# Patient Record
Sex: MALE | Race: BLACK OR AFRICAN AMERICAN | NOT HISPANIC OR LATINO | Employment: UNEMPLOYED | URBAN - METROPOLITAN AREA
[De-identification: names, ages, dates, MRNs, and addresses within clinical notes are randomized per-mention and may not be internally consistent; named-entity substitution may affect disease eponyms.]

---

## 2017-09-25 ENCOUNTER — HOSPITAL ENCOUNTER (OUTPATIENT)
Dept: MEDSURG UNIT | Facility: HOSPITAL | Age: 42
End: 2017-09-26
Attending: SURGERY | Admitting: SURGERY

## 2018-02-20 ENCOUNTER — HISTORICAL (OUTPATIENT)
Dept: ADMINISTRATIVE | Facility: HOSPITAL | Age: 43
End: 2018-02-20

## 2018-03-19 ENCOUNTER — HISTORICAL (OUTPATIENT)
Dept: ADMINISTRATIVE | Facility: HOSPITAL | Age: 43
End: 2018-03-19

## 2018-06-13 ENCOUNTER — HISTORICAL (OUTPATIENT)
Dept: ADMINISTRATIVE | Facility: HOSPITAL | Age: 43
End: 2018-06-13

## 2018-07-06 ENCOUNTER — HISTORICAL (OUTPATIENT)
Dept: ADMINISTRATIVE | Facility: HOSPITAL | Age: 43
End: 2018-07-06

## 2018-11-16 ENCOUNTER — HISTORICAL (OUTPATIENT)
Dept: ADMINISTRATIVE | Facility: HOSPITAL | Age: 43
End: 2018-11-16

## 2019-01-28 ENCOUNTER — HISTORICAL (OUTPATIENT)
Dept: ADMINISTRATIVE | Facility: HOSPITAL | Age: 44
End: 2019-01-28

## 2019-01-28 LAB
BUN SERPL-MCNC: 84 MG/DL (ref 7–18)
CALCIUM SERPL-MCNC: 8.7 MG/DL (ref 8.5–10.1)
CHLORIDE SERPL-SCNC: 102 MMOL/L (ref 98–107)
CO2 SERPL-SCNC: 27 MMOL/L (ref 21–32)
CREAT SERPL-MCNC: 13.4 MG/DL (ref 0.7–1.3)
GLUCOSE SERPL-MCNC: 99 MG/DL (ref 74–106)
POTASSIUM SERPL-SCNC: 5.4 MMOL/L (ref 3.5–5.1)
RESTICK: NORMAL
SODIUM SERPL-SCNC: 135 MMOL/L (ref 136–145)

## 2019-04-12 ENCOUNTER — HISTORICAL (OUTPATIENT)
Dept: CARDIOLOGY | Facility: HOSPITAL | Age: 44
End: 2019-04-12

## 2019-04-12 LAB
ABS NEUT (OLG): 3.42 X10(3)/MCL (ref 2.1–9.2)
BASOPHILS # BLD AUTO: 0 X10(3)/MCL (ref 0–0.2)
BASOPHILS NFR BLD AUTO: 0 %
BUN SERPL-MCNC: 53 MG/DL (ref 7–18)
CALCIUM SERPL-MCNC: 7.4 MG/DL (ref 8.5–10.1)
CHLORIDE SERPL-SCNC: 103 MMOL/L (ref 98–107)
CO2 SERPL-SCNC: 28 MMOL/L (ref 21–32)
CREAT SERPL-MCNC: 14.4 MG/DL (ref 0.7–1.3)
CREAT/UREA NIT SERPL: 3.7
EOSINOPHIL # BLD AUTO: 2.1 X10(3)/MCL (ref 0–0.9)
EOSINOPHIL NFR BLD AUTO: 28 %
ERYTHROCYTE [DISTWIDTH] IN BLOOD BY AUTOMATED COUNT: 16.2 % (ref 11.5–17)
GLUCOSE SERPL-MCNC: 111 MG/DL (ref 74–106)
HCT VFR BLD AUTO: 37.4 % (ref 42–52)
HGB BLD-MCNC: 11.6 GM/DL (ref 14–18)
LYMPHOCYTES # BLD AUTO: 1.8 X10(3)/MCL (ref 0.6–4.6)
LYMPHOCYTES NFR BLD AUTO: 23 %
MCH RBC QN AUTO: 32.8 PG (ref 27–31)
MCHC RBC AUTO-ENTMCNC: 31 GM/DL (ref 33–36)
MCV RBC AUTO: 105.6 FL (ref 80–94)
MONOCYTES # BLD AUTO: 0.4 X10(3)/MCL (ref 0.1–1.3)
MONOCYTES NFR BLD AUTO: 5 %
NEUTROPHILS # BLD AUTO: 3.42 X10(3)/MCL (ref 2.1–9.2)
NEUTROPHILS NFR BLD AUTO: 44 %
PLATELET # BLD AUTO: 87 X10(3)/MCL (ref 130–400)
PMV BLD AUTO: 10.7 FL (ref 9.4–12.4)
POTASSIUM SERPL-SCNC: 4.2 MMOL/L (ref 3.5–5.1)
RBC # BLD AUTO: 3.54 X10(6)/MCL (ref 4.7–6.1)
SODIUM SERPL-SCNC: 139 MMOL/L (ref 136–145)
WBC # SPEC AUTO: 7.8 X10(3)/MCL (ref 4.5–11.5)

## 2019-04-16 ENCOUNTER — HISTORICAL (OUTPATIENT)
Dept: ADMINISTRATIVE | Facility: HOSPITAL | Age: 44
End: 2019-04-16

## 2019-05-11 ENCOUNTER — HISTORICAL (OUTPATIENT)
Dept: ADMINISTRATIVE | Facility: HOSPITAL | Age: 44
End: 2019-05-11

## 2019-05-12 ENCOUNTER — HISTORICAL (OUTPATIENT)
Dept: ADMINISTRATIVE | Facility: HOSPITAL | Age: 44
End: 2019-05-12

## 2019-05-13 ENCOUNTER — HISTORICAL (OUTPATIENT)
Dept: ADMINISTRATIVE | Facility: HOSPITAL | Age: 44
End: 2019-05-13

## 2019-05-15 ENCOUNTER — HISTORICAL (OUTPATIENT)
Dept: ADMINISTRATIVE | Facility: HOSPITAL | Age: 44
End: 2019-05-15

## 2019-05-22 ENCOUNTER — HISTORICAL (OUTPATIENT)
Dept: ADMINISTRATIVE | Facility: HOSPITAL | Age: 44
End: 2019-05-22

## 2019-07-03 ENCOUNTER — HISTORICAL (OUTPATIENT)
Dept: ADMINISTRATIVE | Facility: HOSPITAL | Age: 44
End: 2019-07-03

## 2019-07-15 ENCOUNTER — HISTORICAL (OUTPATIENT)
Dept: ADMINISTRATIVE | Facility: HOSPITAL | Age: 44
End: 2019-07-15

## 2019-08-28 ENCOUNTER — HISTORICAL (OUTPATIENT)
Dept: ADMINISTRATIVE | Facility: HOSPITAL | Age: 44
End: 2019-08-28

## 2019-10-05 ENCOUNTER — HISTORICAL (OUTPATIENT)
Dept: ADMINISTRATIVE | Facility: HOSPITAL | Age: 44
End: 2019-10-05

## 2019-10-27 ENCOUNTER — HISTORICAL (OUTPATIENT)
Dept: ADMINISTRATIVE | Facility: HOSPITAL | Age: 44
End: 2019-10-27

## 2020-02-04 ENCOUNTER — HISTORICAL (OUTPATIENT)
Dept: ADMINISTRATIVE | Facility: HOSPITAL | Age: 45
End: 2020-02-04

## 2020-02-06 ENCOUNTER — HISTORICAL (OUTPATIENT)
Dept: ADMINISTRATIVE | Facility: HOSPITAL | Age: 45
End: 2020-02-06

## 2020-03-03 ENCOUNTER — HISTORICAL (OUTPATIENT)
Dept: ADMINISTRATIVE | Facility: HOSPITAL | Age: 45
End: 2020-03-03

## 2020-04-27 ENCOUNTER — HISTORICAL (OUTPATIENT)
Dept: ADMINISTRATIVE | Facility: HOSPITAL | Age: 45
End: 2020-04-27

## 2022-04-28 NOTE — OP NOTE
ADMITTING DIAGNOSIS:  Infected pseudoaneurysm on the arterial limb of a straight arm arteriovenous graft, left forearm.    PROCEDURES:    1. Incision, drainage, debridement, washout of infected pseudoaneurysm on the arterial limb of a straight arm left forearm arteriovenous graft.   2. Exposure of the graft and identification of a bleeding source from the graft from a previous thrombectomy a year or 2 ago.   3. Removal of graftotomy sutures and thrombectomy due to pulsatile flow in the graft from hemostasis control using a #3 thrombectomy catheter.   4. Resuturing of the graft.   5. Closure of the skin and subcutaneous tissue overlying the graft.    The patient is a 42-year-old  male who had an AV graft that was with an infected pseudoaneurysm on the arterial limb near the radial graft anastomosis.  Patient underwent incision, drainage, and culturing of this area.  Aerobic/anaerobic Gram stain cultures were obtained.  This was done under general anesthetic and prepped and draped under sterile conditions.  Upon cleaning up the infected abscessed area, patient was noted to have a pulsatile bleeder that was from the graft itself.  This underwent control with proximal control the graft using digital pressure.  Patient then had suturing of the graft initially to stop the bleeding.  After this was completed, re-evaluation of the graft was done and there was no bruit and thrill.  I needed to do a thrombectomy and as a result, opened the graft, did an open thrombectomy with a #3 Sudhakar, removed clot that was causing the obstruction and then had reapproximation of the graft with 6-0 Prolene.  After this was accomplished, patient underwent reapproximation of the skin and subcutaneous tissue over the graft with 3-0 nylon.  The deep layer was closed with 3-0 Vicryl.  The skin and subcu was closed with 3-0 nylon.  Sterile dressings were applied.  No problems were encountered.  Patient tolerated the  procedure well.  It should be noted that patient had a good bruit and thrill upon completion and, I think, could probably undergo dialysis.  His pressure is a little bit up.  His last dialysis was on Saturday.  I will consult Ever Munoz M.D., and Carlo Vieyra M.D., for assistance in medical care.  I appreciate the consultation and     assistance of Ever Munoz M.D., and Carlo Vieyra M.D., in the management of this case.        BBS/MODL   DD: 09/25/2017 1536   DT: 09/25/2017 1611  Job # [id number]    cc: SONYA Dumont M.D.

## 2022-04-28 NOTE — CONSULTS
DATE OF CONSULTATION:  09/25/2017    CONSULTING PHYSICIAN:  Ever Munoz M.D.    REASON FOR CONSULTATION:  Medical management of the accelerated hypertension in a patient who has undergone surgery for infected pseudoaneurysm on the arterial limb of the straight arm AV graft in the left forearm.    HISTORY OF PRESENT ILLNESS:  The patient is a very pleasant 42-year-old black male with a history of end-stage renal disease, on Tuesday, Thursday, Saturday cycle of hemodialysis at [place] under 1 week hemodialysis clinic was brought to the [facility] by Dr. Kayode Alamo for the incision and drainage and debridement and washout of the infected pseudoaneurysm on the lateral straight arm left forearm AV graft which was done successfully today in the evening and after that, the patient had uncontrolled hypertension.  I was consulted for the same.  We gave him some clonidine and some labetalol and his blood pressures have come down and his medications have been resumed which for sure will bring the blood pressure down.    PAST MEDICAL HISTORY:  Significant for end-stage renal disease on dialysis, hypertension, gastroesophageal reflux disease.    PAST SURGICAL HISTORY:  Includes multiple surgeries for thrombectomy and fistula placement.    SOCIAL HISTORY:  He does smoke and smokes occasionally some marijuana.    FAMILY HISTORY:  Mother had diabetes.  Father is living.    ALLERGIES:  NO KNOWN DRUG ALLERGIES.      MEDICATIONS:  Clonidine 0.3 mg t.i.d., Renagel 800 mg capsule t.i.d., Norvasc 10 mg daily, docusate 100 mg b.i.d., hydralazine 100 mg t.i.d., minoxidil 2.5 mg b.i.d., nifedipine 90 mg daily.    REVIEW OF SYSTEMS:  As per history of present illness.    PHYSICAL EXAMINATION:  VITAL SIGNS:  The systolic blood pressure is above 170 and diastolic blood pressure is above 110.     GENERAL:  Patient is alert and oriented x3, in no acute distress.   HEENT:  Head is normocephalic.  Pupils are  bilaterally reactive to light, equal in size.  Mild conjunctival pallor.  No scleral icterus.  Trachea is in midline.   CHEST:  Has good bilateral air entry.   CVS:  First and second heart sounds are heard normally with soft systolic murmurs.     ABDOMEN:  Soft, nontender.     EXTREMITIES:  Devoid of any edema, cyanosis, or clubbing.    LABORATORY DATA:  Labs and investigations are as follows:  Sodium 138, potassium 4.3, chloride 103, bicarb 26, calcium 8.0, glucose 98, BUN 62, creatinine 12.18, WBC 7.9, hemoglobin 10.2, hematocrit 31.1 platelet count 148.    ASSESSMENT/PLAN:  Plan is to consult Nephrology for dialysis.  Resume the home medications.  Labetalol IV or __________ IV will be used to bring the blood pressure down.  Monitor the blood pressure.  DVT prophylaxis as per nephrologist protocol.  GI prophylaxis with proton pump inhibitors.     It was a pleasure taking care of the patient during his stay at [facility].  I thank Dr. Kayode Alamo for the consult.        MARGUERITE/ROMEO   DD: 09/25/2017 2047   DT: 09/25/2017 2108  Job # [id number]    cc: Angelito Alamo M.D.

## 2022-04-28 NOTE — OP NOTE
DATE OF SURGERY:    01/28/2019    SURGEON:  Demetri Zarate MD    PREOP DIAGNOSIS:  End-stage renal disease.    POSTOP DIAGNOSIS:  End-stage renal disease.    PROCEDURE PERFORMED:  Left upper extremity arteriovenous fistula revision with branch ligation/superficialization.    ANESTHESIA:  Local with 1% lidocaine.    ESTIMATED BLOOD LOSS:  Less than 30 cc.    COMPLICATIONS:  None.    INDICATIONS FOR PROCEDURE:  The patient is a 44-year-old male with history of end-stage renal disease.  He has a brachiobasilic fistula which has matured; however, this is too deep for cannulation and operation indicated for superficialization.    PROCEDURE IN DETAIL:  After informed consent was obtained, the patient was taken to the operating room and placed in supine position, prepped and draped in usual sterile fashion.  We used ultrasound to identify the fistula.  We marked this throughout its length.  We made an incision with a 15 blade scalpel and carried this down with Bovie electrocauterization.  The fistula was identified and circumferentially dissected.  The fistula was matured.  All side branches were suture ligated and the fistula mobilized easily to the skin surface.  A small flap was made to the lateral aspect of the wound.  The subcutaneous tissue was closed with 3-0 Vicryl suture in a running fashion beneath the fistula, and the remaining tissue was closed with 3-0 Vicryl running fashion followed by 4-0 Monocryl in subcuticular fashion, followed by Dermabond.  The patient tolerated the procedure well without any events or complications, and the patient     was transferred in stable condition to recovery room.  The fistula had an obvious thrill at the conclusion of the procedure.      ______________________________  MD RAFAT Lee III/QI  DD:  01/28/2019  Time:  02:27PM  DT:  01/28/2019  Time:  05:12PM  Job #:  [id number]

## 2022-04-28 NOTE — OP NOTE
DATE OF SURGERY:    04/12/2019    SURGEON:  Demetri Zarate MD    PREOPERATIVE DIAGNOSIS:  End-stage renal disease.    POSTOPERATIVE DIAGNOSIS:  End-stage renal disease.    PROCEDURE PERFORMED:    1. Ultrasound access of left upper extremity arteriovenous fistula.  2. Fistulogram.  3. Balloon angioplasty of venous outflow stenosis.    ANESTHESIA:  Local with 1% lidocaine.    ESTIMATED BLOOD LOSS:  Less than 15 cc.    COMPLICATIONS:  None.    INDICATION FOR PROCEDURE:  The patient is a 44-year-old male with end-stage renal disease who currently undergoes dialysis via a tunneled dialysis catheter.  He has a brachiobasilic fistula in place with pulsatility, and operation indicated for further investigation.    PROCEDURE IN DETAIL:  After informed consent was obtained, the patient was taken to the operating room, placed in supine position, prepped and draped in sterile fashion.  We chose to identify the fistula in the direction of venous outflow.  We accessed with a micropuncture needle followed by a wire via Seldinger technique and ultimately placed a 4-Setswana sheath.  We performed a fistulogram which revealed a widely patent fistula and a focal stenosis of the venous outflow.  The central venous system was widely patent.  We crossed this venous outflow stenosis with a wire and proved true lumen with contrast injection.  We performed balloon angioplasty of this lesion with complete resolution.  There was no arterial inflow stenosis.  At this point, the sheath was removed and a pursestring suture was placed and the site was     hemostatic.  The patient tolerated the procedure without any acute events or complications and was transferred in stable condition to recovery.        ______________________________  MD RAFAT Lee III/  DD:  04/12/2019  Time:  12:01PM  DT:  04/12/2019  Time:  12:21PM  Job #:  [id number]

## 2022-04-28 NOTE — OP NOTE
DATE OF SURGERY:    11/16/2018    SURGEON:  Demetri Zarate MD    PREOPERATIVE DIAGNOSIS:  End-stage renal disease.    POSTOPERATIVE DIAGNOSIS:  End-stage renal disease.    PRESSURE PERFORMED:  Left upper extremity brachial basilic fistula.    ANESTHESIA:  General endotracheal anesthesia.    ESTIMATED BLOOD LOSS:  Less than 20 cc.    COMPLICATIONS:  None.    INDICATIONS FOR PROCEDURE:  Patient is a 43-year-old male with end-stage renal disease.  He has been seen by Dr. Winn in the past and has had a left forearm graft placed, which became infected, and required excision.  He now requires upper extremity access for hemodialysis.    PROCEDURE IN DETAIL:  After informed consent was obtained, the patient was taken to the operating room, placed in supine position.  Prepped and draped in a sterile fashion.  We used ultrasound to identify the superficial veins in his upper extremity, and the cephalic vein was diminutive in size, and his basilic vein seemed suitable for fistula creation.  We made an incision near the antecubital fossa and carried this up with Bovie electrocauterization.  The basilic vein was identified and circumferentially dissected through its entire length and all side branches were suture ligated.  The vein was transected distally and insufflated very well with heparinized saline.  We identified the brachial artery and circumferentially dissected this for small segment and it appeared suitable for arterial inflow.  The patient was heparinized appropriately.  Clamps were placed proximal and distal to the brachial artery.  An 11 blade scalpel used to perform arteriotomy.  This was lengthened with Norris scissors.  The vein was spatulated appropriately and 6-0 Prolene suture was used in circumferential fashion to perform the anastomosis.  Prior to completion of the anastomosis, the usual flush maneuvers were performed.  Anastomosis was complete and anastomosis was hemostatic.  There was a palpable  ulnar pulse of the wrist and there was a palpable thrill in the fistula.  The wound was irrigated with copious amounts of fluid.  The wound was closed in multiple layers with 3-0 Vicryl suture in running fashion, followed by 4-0 Monocryl in subcuticular fashion, followed by     Dermabond.  The patient tolerated the procedure well without any acute events or complications, and was transferred in stable condition to the recovery room.        ______________________________  MD RAFAT Lee III/THERESA  DD:  11/16/2018  Time:  08:41AM  DT:  11/16/2018  Time:  09:19AM  Job #:  [id number]